# Patient Record
Sex: FEMALE | Race: WHITE | NOT HISPANIC OR LATINO | Employment: FULL TIME | URBAN - METROPOLITAN AREA
[De-identification: names, ages, dates, MRNs, and addresses within clinical notes are randomized per-mention and may not be internally consistent; named-entity substitution may affect disease eponyms.]

---

## 2024-06-13 ENCOUNTER — APPOINTMENT (OUTPATIENT)
Dept: RADIOLOGY | Facility: HOSPITAL | Age: 34
End: 2024-06-13
Attending: PHYSICIAN ASSISTANT
Payer: COMMERCIAL

## 2024-06-13 ENCOUNTER — HOSPITAL ENCOUNTER (EMERGENCY)
Facility: HOSPITAL | Age: 34
Discharge: HOME | End: 2024-06-13
Attending: EMERGENCY MEDICINE | Admitting: EMERGENCY MEDICINE
Payer: COMMERCIAL

## 2024-06-13 VITALS
WEIGHT: 221 LBS | OXYGEN SATURATION: 96 % | DIASTOLIC BLOOD PRESSURE: 84 MMHG | RESPIRATION RATE: 27 BRPM | TEMPERATURE: 98.9 F | SYSTOLIC BLOOD PRESSURE: 135 MMHG | HEART RATE: 99 BPM

## 2024-06-13 DIAGNOSIS — R07.89 CHEST TIGHTNESS: ICD-10-CM

## 2024-06-13 LAB
ANION GAP SERPL CALCULATED.3IONS-SCNC: 12 MMOL/L (ref 7–15)
ATRIAL RATE - MUSE: 93 BPM
BASE EXCESS BLDV CALC-SCNC: 1.4 MMOL/L (ref -3–3)
BASOPHILS # BLD AUTO: 0 10E3/UL (ref 0–0.2)
BASOPHILS NFR BLD AUTO: 1 %
BUN SERPL-MCNC: 8.2 MG/DL (ref 6–20)
CALCIUM SERPL-MCNC: 9.4 MG/DL (ref 8.6–10)
CHLORIDE SERPL-SCNC: 101 MMOL/L (ref 98–107)
CREAT SERPL-MCNC: 0.77 MG/DL (ref 0.51–0.95)
D DIMER PPP FEU-MCNC: 0.34 UG/ML FEU (ref 0–0.5)
DEPRECATED HCO3 PLAS-SCNC: 25 MMOL/L (ref 22–29)
DIASTOLIC BLOOD PRESSURE - MUSE: NORMAL MMHG
EGFRCR SERPLBLD CKD-EPI 2021: >90 ML/MIN/1.73M2
EOSINOPHIL # BLD AUTO: 0.2 10E3/UL (ref 0–0.7)
EOSINOPHIL NFR BLD AUTO: 3 %
ERYTHROCYTE [DISTWIDTH] IN BLOOD BY AUTOMATED COUNT: 12.3 % (ref 10–15)
GLUCOSE SERPL-MCNC: 108 MG/DL (ref 70–99)
HCG SERPL QL: NEGATIVE
HCO3 BLDV-SCNC: 27 MMOL/L (ref 21–28)
HCT VFR BLD AUTO: 41.4 % (ref 35–47)
HGB BLD-MCNC: 13.8 G/DL (ref 11.7–15.7)
HOLD SPECIMEN: NORMAL
HOLD SPECIMEN: NORMAL
IMM GRANULOCYTES # BLD: 0 10E3/UL
IMM GRANULOCYTES NFR BLD: 0 %
INTERPRETATION ECG - MUSE: NORMAL
LYMPHOCYTES # BLD AUTO: 1.8 10E3/UL (ref 0.8–5.3)
LYMPHOCYTES NFR BLD AUTO: 22 %
MCH RBC QN AUTO: 30.9 PG (ref 26.5–33)
MCHC RBC AUTO-ENTMCNC: 33.3 G/DL (ref 31.5–36.5)
MCV RBC AUTO: 93 FL (ref 78–100)
MONOCYTES # BLD AUTO: 0.4 10E3/UL (ref 0–1.3)
MONOCYTES NFR BLD AUTO: 5 %
NEUTROPHILS # BLD AUTO: 5.6 10E3/UL (ref 1.6–8.3)
NEUTROPHILS NFR BLD AUTO: 69 %
NRBC # BLD AUTO: 0 10E3/UL
NRBC BLD AUTO-RTO: 0 /100
NT-PROBNP SERPL-MCNC: <36 PG/ML (ref 0–450)
O2/TOTAL GAS SETTING VFR VENT: 21 %
OXYHGB MFR BLDV: 62 % (ref 70–75)
P AXIS - MUSE: 56 DEGREES
PCO2 BLDV: 47 MM HG (ref 40–50)
PH BLDV: 7.36 [PH] (ref 7.32–7.43)
PLATELET # BLD AUTO: 353 10E3/UL (ref 150–450)
PO2 BLDV: 34 MM HG (ref 25–47)
POTASSIUM SERPL-SCNC: 3.6 MMOL/L (ref 3.4–5.3)
PR INTERVAL - MUSE: 128 MS
QRS DURATION - MUSE: 88 MS
QT - MUSE: 350 MS
QTC - MUSE: 435 MS
R AXIS - MUSE: 46 DEGREES
RBC # BLD AUTO: 4.46 10E6/UL (ref 3.8–5.2)
SAO2 % BLDV: 62.2 % (ref 70–75)
SODIUM SERPL-SCNC: 138 MMOL/L (ref 135–145)
SYSTOLIC BLOOD PRESSURE - MUSE: NORMAL MMHG
T AXIS - MUSE: 53 DEGREES
TROPONIN T SERPL HS-MCNC: <6 NG/L
VENTRICULAR RATE- MUSE: 93 BPM
WBC # BLD AUTO: 8.1 10E3/UL (ref 4–11)

## 2024-06-13 PROCEDURE — 99285 EMERGENCY DEPT VISIT HI MDM: CPT | Mod: 25

## 2024-06-13 PROCEDURE — 36415 COLL VENOUS BLD VENIPUNCTURE: CPT | Performed by: EMERGENCY MEDICINE

## 2024-06-13 PROCEDURE — 93005 ELECTROCARDIOGRAM TRACING: CPT | Performed by: EMERGENCY MEDICINE

## 2024-06-13 PROCEDURE — 85025 COMPLETE CBC W/AUTO DIFF WBC: CPT | Performed by: EMERGENCY MEDICINE

## 2024-06-13 PROCEDURE — 85379 FIBRIN DEGRADATION QUANT: CPT | Performed by: PHYSICIAN ASSISTANT

## 2024-06-13 PROCEDURE — 82805 BLOOD GASES W/O2 SATURATION: CPT | Performed by: EMERGENCY MEDICINE

## 2024-06-13 PROCEDURE — 84484 ASSAY OF TROPONIN QUANT: CPT | Performed by: EMERGENCY MEDICINE

## 2024-06-13 PROCEDURE — 80048 BASIC METABOLIC PNL TOTAL CA: CPT | Performed by: EMERGENCY MEDICINE

## 2024-06-13 PROCEDURE — 83880 ASSAY OF NATRIURETIC PEPTIDE: CPT | Performed by: EMERGENCY MEDICINE

## 2024-06-13 PROCEDURE — 84703 CHORIONIC GONADOTROPIN ASSAY: CPT | Performed by: EMERGENCY MEDICINE

## 2024-06-13 PROCEDURE — 71046 X-RAY EXAM CHEST 2 VIEWS: CPT

## 2024-06-13 PROCEDURE — 93005 ELECTROCARDIOGRAM TRACING: CPT | Performed by: STUDENT IN AN ORGANIZED HEALTH CARE EDUCATION/TRAINING PROGRAM

## 2024-06-13 ASSESSMENT — COLUMBIA-SUICIDE SEVERITY RATING SCALE - C-SSRS
6. HAVE YOU EVER DONE ANYTHING, STARTED TO DO ANYTHING, OR PREPARED TO DO ANYTHING TO END YOUR LIFE?: NO
2. HAVE YOU ACTUALLY HAD ANY THOUGHTS OF KILLING YOURSELF IN THE PAST MONTH?: NO
1. IN THE PAST MONTH, HAVE YOU WISHED YOU WERE DEAD OR WISHED YOU COULD GO TO SLEEP AND NOT WAKE UP?: NO

## 2024-06-13 ASSESSMENT — ACTIVITIES OF DAILY LIVING (ADL)
ADLS_ACUITY_SCORE: 35

## 2024-06-13 NOTE — ED PROVIDER NOTES
Emergency Department Midlevel Supervisory Note     I had a face to face encounter with this patient seen by the Advanced Practice Provider (MARTIN). I personally made/approved the management plan and take responsibility for the patient management. I personally saw patient and performed a substantive portion of the visit including all aspects of the medical decision making.     ED Course:  6:34 PM Sanjuanita Newman PA-C staffed patient with me. I agree with their assessment and plan of management, and I will see the patient.  6:35 PM I met with the patient to introduce myself, gather additional history, perform my initial exam, and discuss the plan.     Brief HPI:     Deana Newman is a 33 year old female who presents for evaluation of chest pain and tightness.     Patient reports that her chest tightness began several days ago and resolved, however, it returned today ~3 PM after waking up from a nap with shortness of breath.    I, Mello Hughes, am serving as a scribe to document services personally performed by Raul Baez MD, based on my observations and the provider's statements to me.   I, Raul Baez MD attest that Mello Hughes was acting in a scribe capacity, has observed my performance of the services and has documented them in accordance with my direction.    Brief Physical Exam: /84   Pulse 99   Temp 98.9  F (37.2  C)   Resp 27   Wt 100.2 kg (221 lb)   LMP 05/30/2024   SpO2 96%   Constitutional:  Alert, in no acute distress  EYES: Conjunctivae clear  HENT:  Atraumatic  Respiratory:  Respirations even, unlabored, in no acute respiratory distress  Cardiovascular:  Regular rate and rhythm, good peripheral perfusion  GI: Soft, non-distended, non-tender  Musculoskeletal:  Moves all 4 extremities equally, grossly symmetrical strength  Integument: Warm & dry. No appreciable rash, erythema.  Neurologic:  Alert & oriented, speech clear and fluent, no focal deficits noted  Psych: Normal mood and  affect       MDM:  Again, patient presents with chest pain.  Patient is afebrile with no tachycardia or hypoxia.  Exam reassuring, lung sounds are clear, abdomen is completely benign.  No skin changes or rashes, fevers or productive cough.  Nothing to suggest shingles, no falls or trauma to suggest rib fracture or dislocation.  No reports of any pleurisy, hemoptysis, she is not tachycardic or hypoxic, considered but overall very low suspicion for PE, nothing just dissection, low suspicion for ACS.  Low suspicion for pneumothorax.  Considered more so muscle skeletal etiology or costochondritis.  Will obtain screening labs and imaging studies based on D-dimer.  Patient was offered but deferred pain medications.       1. Chest tightness        Consults:      Labs and Imaging:  Results for orders placed or performed during the hospital encounter of 06/13/24   Chest XR,  PA & LAT    Impression    IMPRESSION:   No acute abnormality.    No focal pulmonary infiltrate, pleural effusion, or pneumothorax. The cardiac size and mediastinal contours appear within normal limits.    Basic metabolic panel   Result Value Ref Range    Sodium 138 135 - 145 mmol/L    Potassium 3.6 3.4 - 5.3 mmol/L    Chloride 101 98 - 107 mmol/L    Carbon Dioxide (CO2) 25 22 - 29 mmol/L    Anion Gap 12 7 - 15 mmol/L    Urea Nitrogen 8.2 6.0 - 20.0 mg/dL    Creatinine 0.77 0.51 - 0.95 mg/dL    GFR Estimate >90 >60 mL/min/1.73m2    Calcium 9.4 8.6 - 10.0 mg/dL    Glucose 108 (H) 70 - 99 mg/dL   Result Value Ref Range    Troponin T, High Sensitivity <6 <=14 ng/L   Blood gas venous   Result Value Ref Range    pH Venous 7.36 7.32 - 7.43    pCO2 Venous 47 40 - 50 mm Hg    pO2 Venous 34 25 - 47 mm Hg    Bicarbonate Venous 27 21 - 28 mmol/L    Base Excess/Deficit Venous 1.4 -3.0 - 3.0 mmol/L    FIO2 21     Oxyhemoglobin Venous 62 (L) 70 - 75 %    O2 Sat, Venous 62.2 (L) 70.0 - 75.0 %   Nt probnp inpatient (BNP)   Result Value Ref Range    N terminal Pro BNP  Inpatient <36 0 - 450 pg/mL   HCG qualitative Blood   Result Value Ref Range    hCG Serum Qualitative Negative Negative   Extra Blue Top Tube   Result Value Ref Range    Hold Specimen JIC    Extra Green Top (Lithium Heparin) Tube   Result Value Ref Range    Hold Specimen JIC    CBC with platelets and differential   Result Value Ref Range    WBC Count 8.1 4.0 - 11.0 10e3/uL    RBC Count 4.46 3.80 - 5.20 10e6/uL    Hemoglobin 13.8 11.7 - 15.7 g/dL    Hematocrit 41.4 35.0 - 47.0 %    MCV 93 78 - 100 fL    MCH 30.9 26.5 - 33.0 pg    MCHC 33.3 31.5 - 36.5 g/dL    RDW 12.3 10.0 - 15.0 %    Platelet Count 353 150 - 450 10e3/uL    % Neutrophils 69 %    % Lymphocytes 22 %    % Monocytes 5 %    % Eosinophils 3 %    % Basophils 1 %    % Immature Granulocytes 0 %    NRBCs per 100 WBC 0 <1 /100    Absolute Neutrophils 5.6 1.6 - 8.3 10e3/uL    Absolute Lymphocytes 1.8 0.8 - 5.3 10e3/uL    Absolute Monocytes 0.4 0.0 - 1.3 10e3/uL    Absolute Eosinophils 0.2 0.0 - 0.7 10e3/uL    Absolute Basophils 0.0 0.0 - 0.2 10e3/uL    Absolute Immature Granulocytes 0.0 <=0.4 10e3/uL    Absolute NRBCs 0.0 10e3/uL   D dimer quantitative   Result Value Ref Range    D-Dimer Quantitative 0.34 0.00 - 0.50 ug/mL FEU   ECG 12-LEAD WITH MUSE (LHE)   Result Value Ref Range    Systolic Blood Pressure  mmHg    Diastolic Blood Pressure  mmHg    Ventricular Rate 93 BPM    Atrial Rate 93 BPM    NJ Interval 128 ms    QRS Duration 88 ms     ms    QTc 435 ms    P Axis 56 degrees    R AXIS 46 degrees    T Axis 53 degrees    Interpretation ECG       Sinus rhythm  Normal ECG  No previous ECGs available  Confirmed by SEE ED PROVIDER NOTE FOR, ECG INTERPRETATION ([id number]),  FLORA ESCAMILLA ([id number]) on 6/13/2024 11:40:38 PM         I have reviewed the relevant laboratory studies above.    Labs by my independent rotation showed no signs of anemia with a hemoglobin 13.8 no signs of cardiac ischemia with troponin less than 6, no signs of acute CHF with BNP less  than 36.    I independently interpreted the following imaging study(s):       EKG: I reviewed and independently interpreted the patient's EKG, with comments made as listed below. Please see scanned EKG for full report.       Procedures:  I was present for the key portions of procedures documented in MARTIN/midlevel note, see midlevel note for further details.    Raul Baez MD  [facility] EMERGENCY DEPARTMENT  [address] [place] [id number] [phone]       Raul Baez MD  06/14/24 0136

## 2024-06-13 NOTE — ED PROVIDER NOTES
Emergency Department Encounter   NAME: Deana Newman ; AGE: 33 year old female ; YOB: 1990 ; MRN: [id number] ; PCP: No primary care provider on file.   ED PROVIDER: Suzanne Newman PA-C    Evaluation Date & Time:   6/13/2024  5:43 PM    CHIEF COMPLAINT:  Chest Pain      Impression and Plan   MDM:   Deana Newman is a 33 year old female with a pertinent history of anxiety who presents to the ED by walk-in with [facility] for evaluation of chest pain.  The patient presents to the emergency department for evaluation of chest tightness that occurred several days ago and then resolved, however returned around 3 PM this afternoon after waking up from a nap with associated shortness of breath.  Tightness has gradually been improving and is now 1 out of 10 discomfort though she continues to feel short of breath.  Here in the ED, she is generally well-appearing, pleasant, and in no acute distress.  Moderately hypertensive to the 150s though vitally stable.  She is breathing comfortably, speaking in full sentences, is 97% on room air, and is not having any increased effort or tachypnea.  Lung sounds are clear to auscultation and there is no wheezing or tightness to suggest asthma/COPD exacerbation.  Differential at this time includes muscular strain, anxiety/panic attack, anemia, pneumonia, pleural effusion, pneumothorax, PE, ACS.    EKG shows normal sinus rhythm without any ST or T wave abnormalities to suggest ischemia. Initial troponin <6. Low heart score with minimal cardiac risk factors - low suspicion for ACS.  No pleuritic nature to her pain or hypoxia to suggest PE, however her initial heart rate was in the 120s.  Given this did consider PE especially as she is on estrogen-based OCPs though my clinical suspicion is low. D-dimer obtained and returned wnl - no further workup for PE is indicated at this time.  No leukocytosis, fever or abnormal vitals.  Blood counts within normal limits.  Negative  pregnancy test.  Chest x-ray obtained and shows no consolidation or infiltrate concerning for pneumonia, pleural effusion or pneumothorax.  BNP within normal limits -no concern for CHF.  Venous blood gas reassuring.  Patient was monitored in the emergency department for several hours with continued improvement in her symptoms.  Highly suspicious for chest wall pain given reproducible tenderness, pain with movement/dancing and expanding her chest in the room.  We discussed supportive measures at home, close monitoring, and close follow-up with PCP in the next week.  She was given strict return precautions and she verbalized understanding.  Discharged home in stable condition.    Medical Decision Making  Obtained supplemental history:Supplemental history obtained?:Fiance   Reviewed external records: External records reviewed?: No  Care impacted by chronic illness:mental health; tobacco use   Care significantly affected by social determinants of health:N/A  Did you consider but not order tests?: Work up considered but not performed and documented in chart, if applicable  Did you interpret images independently?: Independent interpretation of ECG and images noted in documentation, when applicable.  Consultation discussion with other provider:Did you involve another provider (consultant, , pharmacy, etc.)?: No  Discharge. No recommendations on prescription strength medication(s). See documentation for any additional details.    ED COURSE:  5:56 PM I met and introduced myself to the patient. I gathered initial history and performed my physical exam. We discussed plan for initial workup.   6:08 PM I have staffed the patient with Dr. Baez, ED MD, who has evaluated the patient and agrees with all aspects of today's care.   8:00 PM I rechecked the patient and discussed results, discharge, follow up, and reasons to return to the ED.     At the conclusion of the encounter I discussed the results of all the tests and the  disposition. The questions were answered. The patient or family acknowledged understanding and was agreeable with the care plan.    FINAL IMPRESSION:    ICD-10-CM    1. Chest tightness  R07.89             MEDICATIONS GIVEN IN THE EMERGENCY DEPARTMENT:  Medications - No data to display      NEW PRESCRIPTIONS STARTED AT TODAY'S ED VISIT:  There are no discharge medications for this patient.        HPI   Use of Intrepreter: N/A     Deana Newman is a 33 year old female with a pertinent history of anxiety who presents to the ED by walk-in with [facility] for evaluation of chest pain.    The patient has had a few days of right arm tightness and left/central chest tightness. It had been getting better but today 1 hour after waking up (3:00 PM) She developed worse chest tightness and shortness of breath. Since arriving to the ED her chest tightness has gone down to a 1/10 but the shortness of breath has been constant. The tightness does not radiate but is made worse with movement, stretching, and palpation. She does note that work and family stressors are present and she has had a history of anxiety. Yesterday her toe tips were painful but this revolved today. She had been a smoker for 8 years but quit 1-2 years ago. Over the last year she has had asthma symptoms evaluated 2-3 times but has not seen a pulmonologist yet. She takes >3 doses of ibuprofen each day for a tooth ache. She has not seen her dentist yet for this. She takes an oral contraceptive pill with estrogen.     She denies personal history of blood clots, family/personal history of heart problems, fever, cough, or any other complaints at this time. No recent travel.      REVIEW OF SYSTEMS:  Pertinent positive and negative symptoms per HPI.       Medical History     No past medical history on file.    No past surgical history on file.    No family history on file.    Social History     Tobacco Use    Smoking status: Never       No current outpatient  medications on file.        Physical Exam     First Vitals:  Patient Vitals for the past 24 hrs:   BP Temp Pulse Resp SpO2 Weight   06/13/24 2015 135/84 -- 99 -- 96 % --   06/13/24 1803 (!) 151/88 -- 98 27 97 % --   06/13/24 1729 (!) 154/75 98.9  F (37.2  C) 120 16 98 % 100.2 kg (221 lb)         PHYSICAL EXAM:   Physical Exam  Vitals reviewed.   Constitutional:       General: She is not in acute distress.     Appearance: She is not ill-appearing, toxic-appearing or diaphoretic.      Comments: Elevated BMI.    HENT:      Head: Normocephalic.   Cardiovascular:      Rate and Rhythm: Normal rate and regular rhythm.      Pulses:           Radial pulses are 2+ on the left side.      Heart sounds: Normal heart sounds. No murmur heard.  Pulmonary:      Effort: Pulmonary effort is normal.      Breath sounds: Normal breath sounds. No wheezing, rhonchi or rales.   Chest:      Chest wall: Tenderness (left anterior chest; no rashes or skin changes; no crepitus) present.   Abdominal:      Palpations: Abdomen is soft.      Tenderness: There is no abdominal tenderness. There is no guarding or rebound.   Musculoskeletal:      Cervical back: Normal range of motion and neck supple.      Right lower leg: No tenderness. No edema.      Left lower leg: No tenderness. No edema.   Skin:     General: Skin is warm and dry.   Neurological:      Mental Status: She is alert.             Results     LAB:  All pertinent labs reviewed and interpreted  Labs Ordered and Resulted from Time of ED Arrival to Time of ED Departure   BASIC METABOLIC PANEL - Abnormal       Result Value    Sodium 138      Potassium 3.6      Chloride 101      Carbon Dioxide (CO2) 25      Anion Gap 12      Urea Nitrogen 8.2      Creatinine 0.77      GFR Estimate >90      Calcium 9.4      Glucose 108 (*)    BLOOD GAS VENOUS - Abnormal    pH Venous 7.36      pCO2 Venous 47      pO2 Venous 34      Bicarbonate Venous 27      Base Excess/Deficit Venous 1.4      FIO2 21       Oxyhemoglobin Venous 62 (*)     O2 Sat, Venous 62.2 (*)    TROPONIN T, HIGH SENSITIVITY - Normal    Troponin T, High Sensitivity <6     NT PROBNP INPATIENT - Normal    N terminal Pro BNP Inpatient <36     HCG QUALITATIVE PREGNANCY - Normal    hCG Serum Qualitative Negative     D DIMER QUANTITATIVE - Normal    D-Dimer Quantitative 0.34     CBC WITH PLATELETS AND DIFFERENTIAL    WBC Count 8.1      RBC Count 4.46      Hemoglobin 13.8      Hematocrit 41.4      MCV 93      MCH 30.9      MCHC 33.3      RDW 12.3      Platelet Count 353      % Neutrophils 69      % Lymphocytes 22      % Monocytes 5      % Eosinophils 3      % Basophils 1      % Immature Granulocytes 0      NRBCs per 100 WBC 0      Absolute Neutrophils 5.6      Absolute Lymphocytes 1.8      Absolute Monocytes 0.4      Absolute Eosinophils 0.2      Absolute Basophils 0.0      Absolute Immature Granulocytes 0.0      Absolute NRBCs 0.0         RADIOLOGY:  Chest XR,  PA & LAT   Final Result   IMPRESSION:    No acute abnormality.      No focal pulmonary infiltrate, pleural effusion, or pneumothorax. The cardiac size and mediastinal contours appear within normal limits.             ECG:  Performed at: 17:31, 13-JUN-2024    Impression: Normal sinus rhythm. Normal ECG. No STEMI.    Rate: 93 BPM  Rhythm: Sinus rhythm  Axis: 56 46 53  GA Interval: 128 ms  QRS Interval: 88 ms  QTc Interval: 350/435 ms  ST Changes: None  Comparison: None    EKG results reviewed and interpreted by Dr. Carr, ED MD.       I, Fortino Whyte, am serving as a scribe to document services personally performed by Suzanne Newman PA-C, based on my observation and the provider's statements to me. I, Suzanne Newman PA-C attest that Fortino Whyte is acting in a scribe capacity, has observed my performance of the services and has documented them in accordance with my direction.       Suzanne Newman PA-C   Emergency Medicine   [facility] EMERGENCY DEPARTMENT        Suzanne Newman PA-C  06/13/24 1123

## 2024-06-13 NOTE — ED TRIAGE NOTES
"Pt reports chest tightness, heaviness off and on x 4-5 days. She notes \"it was just more concerning today\" she also notes \" some anxiety\" Just stopped her MDI 2 months ago as \"symptoms \" resolved.        "

## 2024-06-14 NOTE — DISCHARGE INSTRUCTIONS
As we discussed, your workup today appeared reassuring.  I suspect your pain is likely coming from your chest wall.  Please alternate between Tylenol and ibuprofen for pain, 10 gentle stretching, avoid heavy lifting this week to see if this improves your discomfort.  I would like you to have a close recheck in your primary care clinic in the next week to make sure symptoms are improving.  If it anytime you develop worsening chest tightness, shortness of breath, dizziness, loss of consciousness, nausea sweatiness or any new or concerning symptoms please return to the ER for further evaluation.    Your blood pressure was elevated in the emergencydepartment today and requires recheck and close follow-up in your primary care clinic. Untreated blood pressure can cause serious complications including, but not limited to stroke, heart attack/failure, and kidney disease.  Please make a close follow-up appointment to have this recheck performed. Please return to the emergency department immediately if you develop a severe headache, vision changes, chest pain, shortness of breath, orabdominal pain.

## 2024-08-04 ENCOUNTER — HEALTH MAINTENANCE LETTER (OUTPATIENT)
Age: 34
End: 2024-08-04

## 2025-08-16 ENCOUNTER — HEALTH MAINTENANCE LETTER (OUTPATIENT)
Age: 35
End: 2025-08-16